# Patient Record
Sex: FEMALE | Race: OTHER | URBAN - METROPOLITAN AREA
[De-identification: names, ages, dates, MRNs, and addresses within clinical notes are randomized per-mention and may not be internally consistent; named-entity substitution may affect disease eponyms.]

---

## 2017-07-03 ENCOUNTER — HOSPITAL ENCOUNTER (EMERGENCY)
Age: 37
Discharge: HOME | End: 2017-07-03
Attending: EMERGENCY MEDICINE
Payer: SELF-PAY

## 2017-07-03 ENCOUNTER — APPOINTMENT (OUTPATIENT)
Dept: CT IMAGING | Age: 37
End: 2017-07-03
Attending: EMERGENCY MEDICINE
Payer: SELF-PAY

## 2017-07-03 VITALS
BODY MASS INDEX: 31.39 KG/M2 | HEIGHT: 67 IN | SYSTOLIC BLOOD PRESSURE: 128 MMHG | TEMPERATURE: 98.9 F | HEART RATE: 98 BPM | WEIGHT: 200 LBS | DIASTOLIC BLOOD PRESSURE: 78 MMHG | RESPIRATION RATE: 16 BRPM | OXYGEN SATURATION: 99 %

## 2017-07-03 DIAGNOSIS — H60.501 ACUTE OTITIS EXTERNA OF RIGHT EAR, UNSPECIFIED TYPE: Primary | ICD-10-CM

## 2017-07-03 DIAGNOSIS — H70.91 MASTOIDITIS, RIGHT: ICD-10-CM

## 2017-07-03 DIAGNOSIS — H66.90 ACUTE OTITIS MEDIA, UNSPECIFIED LATERALITY, UNSPECIFIED OTITIS MEDIA TYPE: ICD-10-CM

## 2017-07-03 LAB
ALBUMIN SERPL BCP-MCNC: 3.4 G/DL (ref 3.5–5)
ALBUMIN/GLOB SERPL: 0.7 {RATIO} (ref 1.2–3.5)
ALP SERPL-CCNC: 101 U/L (ref 50–136)
ALT SERPL-CCNC: 34 U/L (ref 12–65)
ANION GAP BLD CALC-SCNC: 10 MMOL/L (ref 7–16)
AST SERPL W P-5'-P-CCNC: 25 U/L (ref 15–37)
BASOPHILS # BLD AUTO: 0 K/UL (ref 0–0.2)
BASOPHILS # BLD: 0 % (ref 0–2)
BILIRUB SERPL-MCNC: 0.4 MG/DL (ref 0.2–1.1)
BUN SERPL-MCNC: 13 MG/DL (ref 6–23)
CALCIUM SERPL-MCNC: 8.8 MG/DL (ref 8.3–10.4)
CHLORIDE SERPL-SCNC: 102 MMOL/L (ref 98–107)
CO2 SERPL-SCNC: 25 MMOL/L (ref 21–32)
CREAT SERPL-MCNC: 0.77 MG/DL (ref 0.6–1)
DIFFERENTIAL METHOD BLD: ABNORMAL
EOSINOPHIL # BLD: 0.1 K/UL (ref 0–0.8)
EOSINOPHIL NFR BLD: 1 % (ref 0.5–7.8)
ERYTHROCYTE [DISTWIDTH] IN BLOOD BY AUTOMATED COUNT: 13 % (ref 11.9–14.6)
GLOBULIN SER CALC-MCNC: 4.8 G/DL (ref 2.3–3.5)
GLUCOSE SERPL-MCNC: 104 MG/DL (ref 65–100)
HCG UR QL: NEGATIVE
HCG UR QL: NEGATIVE
HCT VFR BLD AUTO: 38.6 % (ref 35.8–46.3)
HGB BLD-MCNC: 13.4 G/DL (ref 11.7–15.4)
IMM GRANULOCYTES # BLD: 0 K/UL (ref 0–0.5)
IMM GRANULOCYTES NFR BLD AUTO: 0.3 % (ref 0–5)
LACTATE BLD-SCNC: 0.9 MMOL/L (ref 0.5–1.9)
LYMPHOCYTES # BLD AUTO: 12 % (ref 13–44)
LYMPHOCYTES # BLD: 1.1 K/UL (ref 0.5–4.6)
MCH RBC QN AUTO: 30.7 PG (ref 26.1–32.9)
MCHC RBC AUTO-ENTMCNC: 34.7 G/DL (ref 31.4–35)
MCV RBC AUTO: 88.3 FL (ref 79.6–97.8)
MONOCYTES # BLD: 1.2 K/UL (ref 0.1–1.3)
MONOCYTES NFR BLD AUTO: 13 % (ref 4–12)
NEUTS SEG # BLD: 7.2 K/UL (ref 1.7–8.2)
NEUTS SEG NFR BLD AUTO: 74 % (ref 43–78)
PLATELET # BLD AUTO: 256 K/UL (ref 150–450)
PMV BLD AUTO: 9.2 FL (ref 10.8–14.1)
POTASSIUM SERPL-SCNC: 3.7 MMOL/L (ref 3.5–5.1)
PROT SERPL-MCNC: 8.2 G/DL (ref 6.3–8.2)
RBC # BLD AUTO: 4.37 M/UL (ref 4.05–5.25)
SODIUM SERPL-SCNC: 137 MMOL/L (ref 136–145)
WBC # BLD AUTO: 9.7 K/UL (ref 4.3–11.1)

## 2017-07-03 PROCEDURE — 81003 URINALYSIS AUTO W/O SCOPE: CPT | Performed by: EMERGENCY MEDICINE

## 2017-07-03 PROCEDURE — 96365 THER/PROPH/DIAG IV INF INIT: CPT | Performed by: EMERGENCY MEDICINE

## 2017-07-03 PROCEDURE — 74011250636 HC RX REV CODE- 250/636: Performed by: EMERGENCY MEDICINE

## 2017-07-03 PROCEDURE — 85025 COMPLETE CBC W/AUTO DIFF WBC: CPT | Performed by: EMERGENCY MEDICINE

## 2017-07-03 PROCEDURE — 80053 COMPREHEN METABOLIC PANEL: CPT | Performed by: EMERGENCY MEDICINE

## 2017-07-03 PROCEDURE — 96375 TX/PRO/DX INJ NEW DRUG ADDON: CPT | Performed by: EMERGENCY MEDICINE

## 2017-07-03 PROCEDURE — 74011000258 HC RX REV CODE- 258: Performed by: EMERGENCY MEDICINE

## 2017-07-03 PROCEDURE — 70480 CT ORBIT/EAR/FOSSA W/O DYE: CPT

## 2017-07-03 PROCEDURE — 83605 ASSAY OF LACTIC ACID: CPT

## 2017-07-03 PROCEDURE — 99284 EMERGENCY DEPT VISIT MOD MDM: CPT | Performed by: EMERGENCY MEDICINE

## 2017-07-03 PROCEDURE — 81025 URINE PREGNANCY TEST: CPT

## 2017-07-03 RX ORDER — OFLOXACIN 3 MG/ML
5 SOLUTION AURICULAR (OTIC) 2 TIMES DAILY
COMMUNITY

## 2017-07-03 RX ORDER — NEOMYCIN SULFATE, POLYMYXIN B SULFATE AND HYDROCORTISONE 10; 3.5; 1 MG/ML; MG/ML; [USP'U]/ML
3 SUSPENSION/ DROPS AURICULAR (OTIC) 3 TIMES DAILY
Qty: 10 ML | Refills: 0 | Status: SHIPPED | OUTPATIENT
Start: 2017-07-03

## 2017-07-03 RX ORDER — AMPICILLIN 500 MG/1
CAPSULE ORAL 2 TIMES DAILY
COMMUNITY

## 2017-07-03 RX ORDER — AMOXICILLIN AND CLAVULANATE POTASSIUM 875; 125 MG/1; MG/1
1 TABLET, FILM COATED ORAL 2 TIMES DAILY
Qty: 20 TAB | Refills: 0 | Status: SHIPPED | OUTPATIENT
Start: 2017-07-03 | End: 2017-07-13

## 2017-07-03 RX ORDER — KETOROLAC TROMETHAMINE 30 MG/ML
30 INJECTION, SOLUTION INTRAMUSCULAR; INTRAVENOUS
Status: COMPLETED | OUTPATIENT
Start: 2017-07-03 | End: 2017-07-03

## 2017-07-03 RX ADMIN — CEFTRIAXONE 1 G: 1 INJECTION, POWDER, FOR SOLUTION INTRAMUSCULAR; INTRAVENOUS at 21:58

## 2017-07-03 RX ADMIN — KETOROLAC TROMETHAMINE 30 MG: 30 INJECTION, SOLUTION INTRAMUSCULAR at 21:58

## 2017-07-03 RX ADMIN — SODIUM CHLORIDE 1000 ML: 900 INJECTION, SOLUTION INTRAVENOUS at 21:58

## 2017-07-03 NOTE — ED PROVIDER NOTES
HPI Comments: Pt with chronic ear infections treated previously in [facility]. Last ear infection a year ago. Pain and drainage started Saturday. Patient is a 40 y.o. female presenting with ear pain. The history is provided by the patient. A  was used. Ear Pain    This is a new problem. The current episode started 2 days ago. The problem occurs constantly. The problem has been gradually worsening. Patient complains that the right ear is affected. There has been no fever. The pain is moderate. Associated symptoms include ear discharge. Pertinent negatives include no headaches, no rhinorrhea, no sore throat, no abdominal pain, no diarrhea, no vomiting, no neck pain and no rash. Her past medical history is significant for chronic ear infection. Past Medical History:   Diagnosis Date    Hearing loss in right ear     w/ chronic infections    Mass of skin     Miscarriage        Past Surgical History:   Procedure Laterality Date    HX  SECTION           Family History:   Problem Relation Age of Onset    Diabetes Other      type 2       Social History     Social History    Marital status:      Spouse name: N/A    Number of children: N/A    Years of education: N/A     Occupational History    Not on file. Social History Main Topics    Smoking status: Never Smoker    Smokeless tobacco: Not on file    Alcohol use No    Drug use: No    Sexual activity: No     Other Topics Concern    Not on file     Social History Narrative         ALLERGIES: Review of patient's allergies indicates no known allergies. Review of Systems   Constitutional: Negative for chills and fever. HENT: Positive for ear discharge and ear pain. Negative for rhinorrhea and sore throat. Eyes: Negative for pain and redness. Respiratory: Negative for chest tightness, shortness of breath and wheezing. Cardiovascular: Negative for chest pain and leg swelling.    Gastrointestinal: Negative for abdominal pain, diarrhea, nausea and vomiting. Genitourinary: Negative for dysuria and hematuria. Musculoskeletal: Negative for back pain, gait problem, neck pain and neck stiffness. Skin: Negative for color change and rash. Neurological: Negative for weakness, numbness and headaches. Vitals:    07/03/17 1910   BP: 131/87   Pulse: (!) 118   Resp: 18   SpO2: 96%   Weight: 90.7 kg (200 lb)   Height: 5' 7\" (1.702 m)            Physical Exam   Constitutional: She is oriented to person, place, and time. She appears well-developed and well-nourished. HENT:   Head: Normocephalic and atraumatic. Right Ear: There is drainage, swelling and tenderness. There is mastoid tenderness. Left Ear: Tympanic membrane, external ear and ear canal normal.   Eyes: Conjunctivae and EOM are normal. Pupils are equal, round, and reactive to light. Neck: Normal range of motion. Neck supple. Cardiovascular: Regular rhythm. Tachycardia present. No murmur heard. Pulmonary/Chest: Effort normal and breath sounds normal. She has no wheezes. Abdominal: Soft. Bowel sounds are normal. There is no tenderness. Musculoskeletal: Normal range of motion. She exhibits no edema. Lymphadenopathy:     She has no cervical adenopathy. Neurological: She is alert and oriented to person, place, and time. Skin: Skin is warm and dry. Nursing note and vitals reviewed. MDM  Number of Diagnoses or Management Options  Diagnosis management comments: Otitis externa and media with mastoiditis without bone involvement. Discussed with Dr. Hanna Marte and will start on abx and will see in clinic for debridement and follow up.         Amount and/or Complexity of Data Reviewed  Clinical lab tests: ordered and reviewed  Tests in the radiology section of CPT®: ordered and reviewed  Tests in the medicine section of CPT®: ordered and reviewed    Patient Progress  Patient progress: stable    ED Course       Procedures           Results Include:    Recent Results (from the past 24 hour(s))   CBC WITH AUTOMATED DIFF    Collection Time: 07/03/17  7:40 PM   Result Value Ref Range    WBC 9.7 4.3 - 11.1 K/uL    RBC 4.37 4.05 - 5.25 M/uL    HGB 13.4 11.7 - 15.4 g/dL    HCT 38.6 35.8 - 46.3 %    MCV 88.3 79.6 - 97.8 FL    MCH 30.7 26.1 - 32.9 PG    MCHC 34.7 31.4 - 35.0 g/dL    RDW 13.0 11.9 - 14.6 %    PLATELET 572 501 - 997 K/uL    MPV 9.2 (L) 10.8 - 14.1 FL    DF AUTOMATED      NEUTROPHILS 74 43 - 78 %    LYMPHOCYTES 12 (L) 13 - 44 %    MONOCYTES 13 (H) 4.0 - 12.0 %    EOSINOPHILS 1 0.5 - 7.8 %    BASOPHILS 0 0.0 - 2.0 %    IMMATURE GRANULOCYTES 0.3 0.0 - 5.0 %    ABS. NEUTROPHILS 7.2 1.7 - 8.2 K/UL    ABS. LYMPHOCYTES 1.1 0.5 - 4.6 K/UL    ABS. MONOCYTES 1.2 0.1 - 1.3 K/UL    ABS. EOSINOPHILS 0.1 0.0 - 0.8 K/UL    ABS. BASOPHILS 0.0 0.0 - 0.2 K/UL    ABS. IMM. GRANS. 0.0 0.0 - 0.5 K/UL   METABOLIC PANEL, COMPREHENSIVE    Collection Time: 07/03/17  7:40 PM   Result Value Ref Range    Sodium 137 136 - 145 mmol/L    Potassium 3.7 3.5 - 5.1 mmol/L    Chloride 102 98 - 107 mmol/L    CO2 25 21 - 32 mmol/L    Anion gap 10 7 - 16 mmol/L    Glucose 104 (H) 65 - 100 mg/dL    BUN 13 6 - 23 MG/DL    Creatinine 0.77 0.6 - 1.0 MG/DL    GFR est AA >60 >60 ml/min/1.73m2    GFR est non-AA >60 >60 ml/min/1.73m2    Calcium 8.8 8.3 - 10.4 MG/DL    Bilirubin, total 0.4 0.2 - 1.1 MG/DL    ALT (SGPT) 34 12 - 65 U/L    AST (SGOT) 25 15 - 37 U/L    Alk.  phosphatase 101 50 - 136 U/L    Protein, total 8.2 6.3 - 8.2 g/dL    Albumin 3.4 (L) 3.5 - 5.0 g/dL    Globulin 4.8 (H) 2.3 - 3.5 g/dL    A-G Ratio 0.7 (L) 1.2 - 3.5     POC LACTIC ACID    Collection Time: 07/03/17  7:46 PM   Result Value Ref Range    Lactic Acid (POC) 0.9 0.5 - 1.9 mmol/L   HCG URINE, QL. - POC    Collection Time: 07/03/17  8:01 PM   Result Value Ref Range    Pregnancy test,urine (POC) NEGATIVE  NEG     HCG URINE, QL. - POC    Collection Time: 07/03/17  8:12 PM   Result Value Ref Range    Pregnancy test,urine (POC) NEGATIVE  NEG       CT ORBIT EAR FOSSA WO CONT (Final result) Result time: 07/03/17 21:21:31     Final result by Dustin Medrano MD (07/03/17 21:21:31)     Impression:     IMPRESSION:    Right otitis externum. Right otitis media. Right mastoiditis. No evidence of cholesteatoma formation at the present time. Unremarkable left temporal bone. Date of Dictation: 7/3/2017 9:16 PM     Narrative:     CT OF THE TEMPORAL BONES    HISTORY: Right ear drainage. COMPARISON : None. TECHNIQUE: High resolution submillimeter axial scanning of each temporal bone  with coronal reconstruction. Dose reduction techniques used: Automated exposure control, adjustment of the  mAs and/or kVp according to patient's size, and iterative reconstruction  techniques. RIGHT:  * MASTOID AIR CELLS: Complete opacification without evidence of bony  destruction. * EXTERNAL EAR: Diffuse soft tissue thickening with obstruction of the external  auditory canal.   * MIDDLE EAR:  -   Completely opacified. The scutum and middle ear ossicles are intact. The  epitympanum is intact. *INNER EAR STRUCTURES:  -   Unremarkable cochlea, vestibule, facial nerve canal, and semicircular  canals.    -   Unremarkable IAC.      LEFT:  * MASTOID AIR CELLS: Clear  * EXTERNAL EAR: Mild cerumen buildup. * MIDDLE EAR:  -   Normal ossicles.  No erosion of the scutum.  No abnormal opacity. *INNER EAR STRUCTURES:  -   Unremarkable cochlea, vestibule, facial nerve canal, and semicircular  canals.    -   Unremarkable IAC.      * NASOPHARYNX: Within normal limits.   * OTHER: No other findings.

## 2017-07-03 NOTE — ED TRIAGE NOTES
Pt presents to ER for R ear pain since Sat, started Abx from [facility] and ear drops from last year.   All; information in chart obtained through [facility]ain #[id number]

## 2017-07-04 NOTE — DISCHARGE INSTRUCTIONS
Oído de nadador: Instrucciones de cuidado - [ Swimmer's Ear: Care Instructions ]  Instrucciones de cuidado    El oído de nadador (otitis externa) es wali inflamación o infección del canal auditivo. Princess es el conducto que va del oído externo hasta el tímpano. Cualquier residuo de [place], arena u otros que entren al canal [place] y permanezcan allí pueden causar oído de nadador. Introducirse hisopos de algodón u otros objetos en el oído para limpiarlo también puede causar princess problema. El oído de nadador puede ser [employer]. Shiva se puede tratar [employer] y la infección con medicamentos. Usted debería sentirse mejor en algunos días. La atención de seguimiento es wali parte clave de bowden tratamiento y seguridad. Asegúrese de hacer y acudir a todas las citas, y llame a bowden médico si está teniendo problemas. También es wali buena idea saber los resultados de los exámenes y mantener wali lista de los medicamentos que erasto. ¿Cómo puede cuidarse en el hogar? Limpieza y cuidado  · Aplíquese las gotas antibióticas nancy se lo indique el médico.  · No utilice gotas para los oídos (que no joel gotas antibióticas) ni ninguna otra cosa dentro de los oídos a menos que bowden médico se lo haya indicado. · Evite que le entre agua en el oído hasta que pase el [place]. Utilice un algodón cubierto con un poco de vaselina nancy tapón. No use tapones de plástico.  · Utilice un secador de pelo a baja temperatura para secarse el oído con cuidado después de ducharse. · Para aliviar el dolor, póngase wali toallita tibia [place]. · [employer] analgésicos (medicamentos para el dolor) exactamente según las indicaciones. ¨ Si el médico le recetó un analgésico, tómelo según las indicaciones. ¨ Si no está tomando un analgésico recetado, pregúntele a bowden médico si puede shai chris de [employer]. Aplicación de gotas para los oídos  · [employer] gotas a la temperatura corporal haciendo rodar el recipiente [employer].  O puede ponerlo en wali taza de agua tibia lopez algunos minutos. · Acuéstese, con el oído [place]. · Póngase gotas dentro del oído. Siga las instrucciones de bowden médico (o las indicaciones de la etiqueta) para saber cuántas gotas usar. Mueva bowden oreja con suavidad o tire de anna [place] atrás para ayudar a que las gotas entren en el oído. · Es importante mantener el líquido en el canal auditivo por entre 3 y 5 minutos. ¿Cuándo debe pedir ayuda? Llame a bowden médico ahora mismo o busque atención médica inmediata si:  · Tiene fiebre nueva o más vita. · Tiene nuevo o mayor dolor, hinchazón, calor o enrojecimiento alrededor o detrás de la [place]. · Bowden oído presenta secreción de pus o heraclio nueva o que está aumentando. Preste especial atención a los cambios en bowden diego y asegúrese de comunicarse con bowden médico si:  · No está mejorando después de 2 días (48 horas). ¿Dónde puede encontrar más información en inglés? Ronaldo Litter a http://yadi-gregg.info/. Agustin Marquez [id number] en la búsqueda para aprender más acerca de \"Oído de nadador: Instrucciones de cuidado - [ Swimmer's Ear: Care Instructions ]. \"  Revisado: 29 julio, 2016  Versión del contenido: 11.3  © 5424-8962 Healthwise, Incorporated. Las instrucciones de cuidado fueron adaptadas bajo licencia por [employer] (which disclaims liability or warranty for this information). Si usted tiene [place] afección médica o sobre estas instrucciones, siempre pregunte a bowden profesional de diego. Healthwise, Incorporated niega toda garantía o responsabilidad por bowden uso de esta información. Infección del oído (otitis media): Instrucciones de cuidado - [ Ear Infection (Otitis Media): Care Instructions ]  Instrucciones de cuidado    Wali infección de oído podría comenzar con un resfriado y afectar el oído medio (otitis media). Puede doler mucho. La mayoría de las infecciones de oído sanan por sí solas en un par de días.  A menudo, no se necesitan antibióticos. La razón es que muchas infecciones de oído están causadas por virus. Los antibióticos no funcionan contra los virus. Las dosis regulares de analgésicos (medicamentos para el dolor) son la mejor manera de reducir la fiebre y ayudarle a sentirse mejor. La atención de seguimiento es wali parte clave de orantes tratamiento y seguridad. Asegúrese de hacer y acudir a todas las citas, y llame a orantes médico si está teniendo problemas. También es wali buena idea saber los resultados de los exámenes y mantener wali lista de los medicamentos que erasto. ¿Cómo puede cuidarse en el hogar? · [employer] analgésicos exactamente nancy le fueron indicados. ¨ Si el médico le recetó un analgésico, tómelo según las indicaciones. ¨ Si no está tomando un analgésico recetado, tome chris de venta genet, nancy acetaminofén (Tylenol), ibuprofeno (Advil, Motrin) o naproxeno (Aleve). Beti y siga todas las instrucciones de la [place]. ¨ No tome dos o más analgésicos al mismo tiempo a menos que el médico se lo haya indicado. Muchos analgésicos contienen acetaminofén, es decir, Tylenol. El exceso de acetaminofén (Tylenol) puede ser dañino. · Planee tomarse wali dosis completa de analgésico antes de acostarse. Dormir lo suficiente le ayudará a sentirse mejor. · Pruebe con wali toallita tibia húmeda en el oído. Bran vez le ayude a aliviar el dolor. · Si orantes médico le recetó antibióticos, tómelos según las indicaciones. No deje de tomarlos por el hecho de sentirse mejor. Debe shai todos los antibióticos hasta terminarlos. ¿Cuándo debe pedir ayuda? Llame a orantes médico ahora mismo o busque atención médica inmediata si:  · Tiene dolor de oído nuevo o que [place]. · Orantes oído presenta secreción de pus o heraclio nueva o que está aumentando. · Tiene fiebre junto con rigidez en el yari o un dolor de april intenso.   Preste especial atención a los cambios en orantes diego y asegúrese de comunicarse con orantes médico si:  · Tiene síntomas nuevos o que Diane Schmitz. · No mejora después de pooja tomado un antibiótico lopez 2 días. ¿Dónde puede encontrar más información en inglés? Jewel Flowerse a http://yadi-gregg.info/. Kia Roberts en la búsqueda para aprender más acerca de \"Infección del oído (otitis media): Instrucciones de cuidado - [ Ear Infection (Otitis Media): Care Instructions ]. \"  Revisado: 29 julio, 2016  Versión del contenido: 11.3  © 3674-5552 Healthwise, Incorporated. Las instrucciones de cuidado fueron adaptadas bajo licencia por [employer] (which disclaims liability or warranty for this information). Si usted tiene [place] afección médica o sobre estas instrucciones, siempre pregunte a bowden profesional de diego. Healthwise, Incorporated niega toda garantía o responsabilidad por bowden uso de esta información.

## 2017-07-04 NOTE — ED NOTES
I have reviewed discharge instructions with the patient. The patient verbalized understanding. Patient was given prescription. Pt ambulatory upon discharge home with spouse. All instructions given through hospital  with pt.

## 2017-07-14 PROBLEM — B37.31 VAGINAL CANDIDIASIS: Status: ACTIVE | Noted: 2017-07-14

## 2017-07-14 PROBLEM — J02.9 SORE THROAT: Status: ACTIVE | Noted: 2017-07-14

## 2017-07-14 PROBLEM — N89.8 VAGINAL PRURITUS: Status: ACTIVE | Noted: 2017-07-14

## 2022-03-19 PROBLEM — B37.31 VAGINAL CANDIDIASIS: Status: ACTIVE | Noted: 2017-07-14

## 2022-03-19 PROBLEM — J02.9 SORE THROAT: Status: ACTIVE | Noted: 2017-07-14

## 2022-03-20 PROBLEM — N89.8 VAGINAL PRURITUS: Status: ACTIVE | Noted: 2017-07-14

## 2023-03-09 ENCOUNTER — OFFICE VISIT (OUTPATIENT)
Dept: SURGERY | Age: 43
End: 2023-03-09

## 2023-03-09 ENCOUNTER — PREP FOR PROCEDURE (OUTPATIENT)
Dept: SURGERY | Age: 43
End: 2023-03-09

## 2023-03-09 VITALS
OXYGEN SATURATION: 99 % | HEIGHT: 67 IN | DIASTOLIC BLOOD PRESSURE: 80 MMHG | BODY MASS INDEX: 35.63 KG/M2 | SYSTOLIC BLOOD PRESSURE: 122 MMHG | HEART RATE: 86 BPM | WEIGHT: 227 LBS

## 2023-03-09 DIAGNOSIS — D22.9 MULTIPLE ATYPICAL SKIN MOLES: ICD-10-CM

## 2023-03-09 DIAGNOSIS — R22.2 MASS OF SUBCUTANEOUS TISSUE OF BACK: ICD-10-CM

## 2023-03-09 PROCEDURE — 99204 OFFICE O/P NEW MOD 45 MIN: CPT | Performed by: STUDENT IN AN ORGANIZED HEALTH CARE EDUCATION/TRAINING PROGRAM

## 2023-03-09 NOTE — PROGRESS NOTES
General Surgery New Patient Office Note:    3/9/2023    Estelle Ballard  MRN: [id number]      CHIEF COMPLAINT: Back mass    PRIMARY CARE PHYSICIAN: Pcp No    HISTORY: Patient presents with a left-sided back mass. Patient states it has been there for couple of years. she states that it is getting larger in size. Patient denies the area ever being infected or draining. Patient does state that it causes her some discomfort in that area    REVIEW OF SYSTEMS: 10 Point ROS negative except what is in HPI      History reviewed. No pertinent past medical history. No current outpatient medications on file. No current facility-administered medications for this visit. No family history on file. Social History     Socioeconomic History    Marital status:      Spouse name: None    Number of children: None    Years of education: None    Highest education level: None   Tobacco Use    Smoking status: Never    Smokeless tobacco: Never         PHYSICAL EXAMINATION:    /80   Pulse 86   Ht 5' 7\" (1.702 m)   Wt 227 lb (103 kg)   SpO2 99%   BMI 35.55 kg/m²     General Appearance AOx3, in no acute distress  Eyes Conjunctivae/corneas clear. PERRL, EOM's intact. No scleral icterus  Ears, Nose, Throat ENT exam normal, no neck nodes or sinus tenderness  Neck supple, no significant adenopathy. No notable JVD  Respiratory No chest wall deformities or tenderness, respiratory effort normal, no use of accessory muscles. Cardiovascular RRR. No chest wall tenderness. Gastrointestinal Abdomen soft, nontender, nondistended. BS x4. No rebound, guarding, or rigidity present. No palpable masses. No CVA tenderness  Lymphatics No palpable lymphadenopathy, no hepatosplenomegaly  Musculoskeletal No joint tenderness, deformity or swelling. Full ROM UE/LE. Distal pulses intact UE/LE. No edema, cyanosis, or venous stasis changes.   Skin Normal coloration and turgor, no rashes, large back mass on the left scapular region. 2 atypical moles within the same area  Neurological alert, oriented, normal speech, no focal findings or movement disorder noted, CN II-XII intact  Psychiatric Alert and oriented, appropriate affect      STUDIES: None    IMPRESSION:  Back mass  Atypical moles    PLAN:  We will plan for surgical excision of the back mass. I did discuss with her that sitting over her muscle belly and I am unsure if this is incorporated within her muscle. Also due to the size I recommend doing this within the hospital.  I will excise this and shave off her 2 abnormal appearing moles. Risks of surgery discussed with pt and/or family present include risks of anesthesia (cardiopulmonary complications), MI, CVA, DVT,pain, bleeding, infection, injury to local viscera, wound problems, conversion to open procedure if laparoscopic procedure planned, and incomplete symptom resolution. They understand and agree to proceed.

## 2023-05-04 RX ORDER — DEXAMETHASONE SODIUM PHOSPHATE 1 MG/ML
3 SOLUTION/ DROPS OPHTHALMIC 3 TIMES DAILY
COMMUNITY
End: 2023-05-04

## 2023-05-08 ENCOUNTER — PREP FOR PROCEDURE (OUTPATIENT)
Dept: SURGERY | Age: 43
End: 2023-05-08

## 2023-05-08 RX ORDER — SODIUM CHLORIDE 0.9 % (FLUSH) 0.9 %
5-40 SYRINGE (ML) INJECTION PRN
Status: CANCELLED | OUTPATIENT
Start: 2023-05-08

## 2023-05-08 RX ORDER — SODIUM CHLORIDE 0.9 % (FLUSH) 0.9 %
5-40 SYRINGE (ML) INJECTION EVERY 12 HOURS SCHEDULED
Status: CANCELLED | OUTPATIENT
Start: 2023-05-08

## 2023-05-08 RX ORDER — SODIUM CHLORIDE 9 MG/ML
INJECTION, SOLUTION INTRAVENOUS PRN
Status: CANCELLED | OUTPATIENT
Start: 2023-05-08

## 2023-05-19 ENCOUNTER — ANESTHESIA EVENT (OUTPATIENT)
Dept: SURGERY | Age: 43
End: 2023-05-19

## 2023-05-22 ENCOUNTER — HOSPITAL ENCOUNTER (OUTPATIENT)
Age: 43
Setting detail: OUTPATIENT SURGERY
Discharge: HOME | End: 2023-05-22
Attending: STUDENT IN AN ORGANIZED HEALTH CARE EDUCATION/TRAINING PROGRAM | Admitting: STUDENT IN AN ORGANIZED HEALTH CARE EDUCATION/TRAINING PROGRAM

## 2023-05-22 ENCOUNTER — ANESTHESIA (OUTPATIENT)
Dept: SURGERY | Age: 43
End: 2023-05-22

## 2023-05-22 VITALS
SYSTOLIC BLOOD PRESSURE: 116 MMHG | TEMPERATURE: 97.7 F | HEART RATE: 73 BPM | RESPIRATION RATE: 15 BRPM | OXYGEN SATURATION: 95 % | HEIGHT: 67 IN | DIASTOLIC BLOOD PRESSURE: 56 MMHG | BODY MASS INDEX: 36.1 KG/M2 | WEIGHT: 230 LBS

## 2023-05-22 DIAGNOSIS — R22.2 MASS OF SUBCUTANEOUS TISSUE OF BACK: Primary | ICD-10-CM

## 2023-05-22 LAB — HCG UR QL: NEGATIVE

## 2023-05-22 PROCEDURE — 2709999900 HC NON-CHARGEABLE SUPPLY: Performed by: STUDENT IN AN ORGANIZED HEALTH CARE EDUCATION/TRAINING PROGRAM

## 2023-05-22 PROCEDURE — 81025 URINE PREGNANCY TEST: CPT

## 2023-05-22 PROCEDURE — 3600000012 HC SURGERY LEVEL 2 ADDTL 15MIN: Performed by: STUDENT IN AN ORGANIZED HEALTH CARE EDUCATION/TRAINING PROGRAM

## 2023-05-22 PROCEDURE — 3700000000 HC ANESTHESIA ATTENDED CARE: Performed by: STUDENT IN AN ORGANIZED HEALTH CARE EDUCATION/TRAINING PROGRAM

## 2023-05-22 PROCEDURE — 2500000003 HC RX 250 WO HCPCS: Performed by: STUDENT IN AN ORGANIZED HEALTH CARE EDUCATION/TRAINING PROGRAM

## 2023-05-22 PROCEDURE — 21931 EXC BACK LES SC 3 CM/>: CPT | Performed by: STUDENT IN AN ORGANIZED HEALTH CARE EDUCATION/TRAINING PROGRAM

## 2023-05-22 PROCEDURE — 11300 SHAVE SKIN LESION 0.5 CM/<: CPT | Performed by: STUDENT IN AN ORGANIZED HEALTH CARE EDUCATION/TRAINING PROGRAM

## 2023-05-22 PROCEDURE — 7100000000 HC PACU RECOVERY - FIRST 15 MIN: Performed by: STUDENT IN AN ORGANIZED HEALTH CARE EDUCATION/TRAINING PROGRAM

## 2023-05-22 PROCEDURE — 7100000001 HC PACU RECOVERY - ADDTL 15 MIN: Performed by: STUDENT IN AN ORGANIZED HEALTH CARE EDUCATION/TRAINING PROGRAM

## 2023-05-22 PROCEDURE — 6370000000 HC RX 637 (ALT 250 FOR IP): Performed by: ANESTHESIOLOGY

## 2023-05-22 PROCEDURE — 3600000002 HC SURGERY LEVEL 2 BASE: Performed by: STUDENT IN AN ORGANIZED HEALTH CARE EDUCATION/TRAINING PROGRAM

## 2023-05-22 PROCEDURE — 88305 TISSUE EXAM BY PATHOLOGIST: CPT

## 2023-05-22 PROCEDURE — 6360000002 HC RX W HCPCS: Performed by: NURSE ANESTHETIST, CERTIFIED REGISTERED

## 2023-05-22 PROCEDURE — 2500000003 HC RX 250 WO HCPCS: Performed by: NURSE ANESTHETIST, CERTIFIED REGISTERED

## 2023-05-22 PROCEDURE — 88304 TISSUE EXAM BY PATHOLOGIST: CPT

## 2023-05-22 PROCEDURE — 2580000003 HC RX 258: Performed by: ANESTHESIOLOGY

## 2023-05-22 PROCEDURE — 7100000010 HC PHASE II RECOVERY - FIRST 15 MIN: Performed by: STUDENT IN AN ORGANIZED HEALTH CARE EDUCATION/TRAINING PROGRAM

## 2023-05-22 PROCEDURE — 11106 INCAL BX SKN SINGLE LES: CPT | Performed by: STUDENT IN AN ORGANIZED HEALTH CARE EDUCATION/TRAINING PROGRAM

## 2023-05-22 PROCEDURE — 6360000002 HC RX W HCPCS: Performed by: STUDENT IN AN ORGANIZED HEALTH CARE EDUCATION/TRAINING PROGRAM

## 2023-05-22 PROCEDURE — 3700000001 HC ADD 15 MINUTES (ANESTHESIA): Performed by: STUDENT IN AN ORGANIZED HEALTH CARE EDUCATION/TRAINING PROGRAM

## 2023-05-22 PROCEDURE — 7100000011 HC PHASE II RECOVERY - ADDTL 15 MIN: Performed by: STUDENT IN AN ORGANIZED HEALTH CARE EDUCATION/TRAINING PROGRAM

## 2023-05-22 RX ORDER — PROPOFOL 10 MG/ML
INJECTION, EMULSION INTRAVENOUS CONTINUOUS PRN
Status: DISCONTINUED | OUTPATIENT
Start: 2023-05-22 | End: 2023-05-22 | Stop reason: SDUPTHER

## 2023-05-22 RX ORDER — DOCUSATE SODIUM 100 MG/1
100 CAPSULE, LIQUID FILLED ORAL 2 TIMES DAILY
Qty: 60 CAPSULE | Refills: 0 | Status: SHIPPED | OUTPATIENT
Start: 2023-05-22 | End: 2023-06-21

## 2023-05-22 RX ORDER — OXYCODONE HYDROCHLORIDE 5 MG/1
5 TABLET ORAL
Status: DISCONTINUED | OUTPATIENT
Start: 2023-05-22 | End: 2023-05-22 | Stop reason: HOSPADM

## 2023-05-22 RX ORDER — BUPIVACAINE HYDROCHLORIDE 5 MG/ML
INJECTION, SOLUTION EPIDURAL; INTRACAUDAL PRN
Status: DISCONTINUED | OUTPATIENT
Start: 2023-05-22 | End: 2023-05-22 | Stop reason: ALTCHOICE

## 2023-05-22 RX ORDER — MIDAZOLAM HYDROCHLORIDE 1 MG/ML
INJECTION INTRAMUSCULAR; INTRAVENOUS PRN
Status: DISCONTINUED | OUTPATIENT
Start: 2023-05-22 | End: 2023-05-22 | Stop reason: SDUPTHER

## 2023-05-22 RX ORDER — MIDAZOLAM HYDROCHLORIDE 2 MG/2ML
2 INJECTION, SOLUTION INTRAMUSCULAR; INTRAVENOUS
Status: DISCONTINUED | OUTPATIENT
Start: 2023-05-22 | End: 2023-05-22 | Stop reason: HOSPADM

## 2023-05-22 RX ORDER — DEXAMETHASONE SODIUM PHOSPHATE 4 MG/ML
INJECTION, SOLUTION INTRA-ARTICULAR; INTRALESIONAL; INTRAMUSCULAR; INTRAVENOUS; SOFT TISSUE PRN
Status: DISCONTINUED | OUTPATIENT
Start: 2023-05-22 | End: 2023-05-22 | Stop reason: SDUPTHER

## 2023-05-22 RX ORDER — ONDANSETRON 2 MG/ML
4 INJECTION INTRAMUSCULAR; INTRAVENOUS
Status: DISCONTINUED | OUTPATIENT
Start: 2023-05-22 | End: 2023-05-22 | Stop reason: HOSPADM

## 2023-05-22 RX ORDER — LIDOCAINE HYDROCHLORIDE AND EPINEPHRINE 10; 10 MG/ML; UG/ML
INJECTION, SOLUTION INFILTRATION; PERINEURAL PRN
Status: DISCONTINUED | OUTPATIENT
Start: 2023-05-22 | End: 2023-05-22 | Stop reason: ALTCHOICE

## 2023-05-22 RX ORDER — SODIUM CHLORIDE 0.9 % (FLUSH) 0.9 %
5-40 SYRINGE (ML) INJECTION PRN
Status: DISCONTINUED | OUTPATIENT
Start: 2023-05-22 | End: 2023-05-22 | Stop reason: HOSPADM

## 2023-05-22 RX ORDER — SODIUM CHLORIDE 0.9 % (FLUSH) 0.9 %
5-40 SYRINGE (ML) INJECTION EVERY 12 HOURS SCHEDULED
Status: DISCONTINUED | OUTPATIENT
Start: 2023-05-22 | End: 2023-05-22 | Stop reason: HOSPADM

## 2023-05-22 RX ORDER — LIDOCAINE HYDROCHLORIDE 20 MG/ML
INJECTION, SOLUTION EPIDURAL; INFILTRATION; INTRACAUDAL; PERINEURAL PRN
Status: DISCONTINUED | OUTPATIENT
Start: 2023-05-22 | End: 2023-05-22 | Stop reason: SDUPTHER

## 2023-05-22 RX ORDER — HYDROMORPHONE HYDROCHLORIDE 2 MG/ML
0.5 INJECTION, SOLUTION INTRAMUSCULAR; INTRAVENOUS; SUBCUTANEOUS EVERY 5 MIN PRN
Status: DISCONTINUED | OUTPATIENT
Start: 2023-05-22 | End: 2023-05-22 | Stop reason: HOSPADM

## 2023-05-22 RX ORDER — SODIUM CHLORIDE 9 MG/ML
INJECTION, SOLUTION INTRAVENOUS PRN
Status: DISCONTINUED | OUTPATIENT
Start: 2023-05-22 | End: 2023-05-22 | Stop reason: HOSPADM

## 2023-05-22 RX ORDER — FENTANYL CITRATE 50 UG/ML
100 INJECTION, SOLUTION INTRAMUSCULAR; INTRAVENOUS
Status: DISCONTINUED | OUTPATIENT
Start: 2023-05-22 | End: 2023-05-22 | Stop reason: HOSPADM

## 2023-05-22 RX ORDER — TRAMADOL HYDROCHLORIDE 50 MG/1
50 TABLET ORAL EVERY 4 HOURS PRN
Qty: 15 TABLET | Refills: 0 | Status: SHIPPED | OUTPATIENT
Start: 2023-05-22 | End: 2023-05-25

## 2023-05-22 RX ORDER — FENTANYL CITRATE 50 UG/ML
INJECTION, SOLUTION INTRAMUSCULAR; INTRAVENOUS PRN
Status: DISCONTINUED | OUTPATIENT
Start: 2023-05-22 | End: 2023-05-22 | Stop reason: SDUPTHER

## 2023-05-22 RX ORDER — ONDANSETRON 2 MG/ML
INJECTION INTRAMUSCULAR; INTRAVENOUS PRN
Status: DISCONTINUED | OUTPATIENT
Start: 2023-05-22 | End: 2023-05-22 | Stop reason: SDUPTHER

## 2023-05-22 RX ORDER — ACETAMINOPHEN 500 MG
1000 TABLET ORAL ONCE
Status: COMPLETED | OUTPATIENT
Start: 2023-05-22 | End: 2023-05-22

## 2023-05-22 RX ORDER — SODIUM CHLORIDE, SODIUM LACTATE, POTASSIUM CHLORIDE, CALCIUM CHLORIDE 600; 310; 30; 20 MG/100ML; MG/100ML; MG/100ML; MG/100ML
INJECTION, SOLUTION INTRAVENOUS CONTINUOUS
Status: DISCONTINUED | OUTPATIENT
Start: 2023-05-22 | End: 2023-05-22 | Stop reason: HOSPADM

## 2023-05-22 RX ORDER — DIPHENHYDRAMINE HYDROCHLORIDE 50 MG/ML
12.5 INJECTION INTRAMUSCULAR; INTRAVENOUS
Status: DISCONTINUED | OUTPATIENT
Start: 2023-05-22 | End: 2023-05-22 | Stop reason: HOSPADM

## 2023-05-22 RX ADMIN — PROPOFOL 200 MCG/KG/MIN: 10 INJECTION, EMULSION INTRAVENOUS at 07:24

## 2023-05-22 RX ADMIN — LIDOCAINE HYDROCHLORIDE 50 MG: 20 INJECTION, SOLUTION EPIDURAL; INFILTRATION; INTRACAUDAL; PERINEURAL at 07:24

## 2023-05-22 RX ADMIN — Medication 2000 MG: at 07:24

## 2023-05-22 RX ADMIN — ONDANSETRON 4 MG: 2 INJECTION INTRAMUSCULAR; INTRAVENOUS at 07:32

## 2023-05-22 RX ADMIN — DEXAMETHASONE SODIUM PHOSPHATE 4 MG: 4 INJECTION, SOLUTION INTRAMUSCULAR; INTRAVENOUS at 07:32

## 2023-05-22 RX ADMIN — SODIUM CHLORIDE, POTASSIUM CHLORIDE, SODIUM LACTATE AND CALCIUM CHLORIDE: 600; 310; 30; 20 INJECTION, SOLUTION INTRAVENOUS at 06:19

## 2023-05-22 RX ADMIN — ACETAMINOPHEN 1000 MG: 500 TABLET, FILM COATED ORAL at 06:19

## 2023-05-22 RX ADMIN — FENTANYL CITRATE 50 MCG: 50 INJECTION, SOLUTION INTRAMUSCULAR; INTRAVENOUS at 07:26

## 2023-05-22 RX ADMIN — MIDAZOLAM 1 MG: 1 INJECTION INTRAMUSCULAR; INTRAVENOUS at 07:26

## 2023-05-22 ASSESSMENT — PAIN - FUNCTIONAL ASSESSMENT: PAIN_FUNCTIONAL_ASSESSMENT: 0-10

## 2023-05-22 NOTE — PERIOP NOTE
Discharge instructions reviewed with pt and pt's , Janis Costa, at bedside. No questions or concerns at this time. Rxs escribed.

## 2023-05-22 NOTE — PROGRESS NOTES
Patient/caregivers speak [place]  as their preferred language for their healthcare communication. For safe communication, use the [employer]  carts or call:    Senior /Navigator Kenna Lopez at [phone] or   [employer] phone services for [facility] at [phone]    General phone: [phone] ( [phone])  Email: [email or web address]    Always document the use of interpreting services ('s ID number) in your clinical notes. Our interpreters are available for team members working with limited  [place] proficient (LEP) patients remotely, via phone or video or in person (if needed for special cases). When using family members to interpret, for the safety of the patient and protection of the communication of both our patient and [facility] staff the VRI or telephonic  should remain on the line to monitor that all communication is accurate and complete. The  should be instructed to notify [facility] staff immediately if there are any inaccuracies.          Thank you,        Kenna GAFFNEY  Senior /Navigator

## 2023-05-22 NOTE — OP NOTE
Excision of back Mass Procedure Note      Name:  Sapphire Vásquez Date/Time of Admission: 2023  5:37 AM  CSN: [id number]  Attending Provider: No att. providers found  MRN:  [id number]  : 1980 44 y.o. Pre-operative Diagnosis: back Mass, 2 abnormal moles    Post-operative Diagnosis: Same    Procedure:  Excision of back mass 6x5cm, excision of mole, shave bx of mole    Surgeon: Luis M Villegas DO     Anesthesia: MAC anesthesia    Specimen: mass     INDICATION: The patient is a 81-year-old female who complains of mass to the back. They would like it surgically removed. DESCRIPTION OF PROCEDURE: The patient was correctly identified in preoperative holding area. Consent was confirmed and placed on the chart. Preoperative antibiotics were administered per SCIP protocol. The patient was then taken back to the operative suite and placed in the lateral position. MAC anesthesia was performed per anesthesia. The patient's was then prepped and draped in the usual sterile fashion. A timeout was performed and all members of the team that were present were in agreement. I utilized a 10 blade to shave off the superior mole. Hemostasis was noted. Bacitracin was placed on the incision. The procedure began by making an elliptical incision over the apex of the mass that encompassed one of the moles. Scalpel was used to sharply dissect through the subcuticular tissue to the capsule of the mass. At this point cautery was used to dissect circumferentially around the mass and its capsule. It was removed in its entirety. Further inspection of the cavity noted no other acute abnormalities. Hemostasis was achieved with electric cautery. The wound was irrigated with normal saline. 3-0 Monocryl suture was used to close the skin in a simple interrupted fashion. Steri strips was placed on the incision. At this time, the procedure was complete.   At the conclusion of the case all sponge,

## 2023-05-22 NOTE — ANESTHESIA POSTPROCEDURE EVALUATION
Department of Anesthesiology  Postprocedure Note    Patient: Keanu Jose  MRN: [id number]  YOB: 1980  Date of evaluation: 5/22/2023      Procedure Summary     Date: 05/22/23 Room / Location: [facility] OP OR 07 / D OPC    Anesthesia Start: 0710 Anesthesia Stop: 0805    Procedure: BACK LESION BIOPSY EXCISION AND SHAVE BIOPSY OF TWO MOLES (Back) Diagnosis:       Mass on back      (Mass on back [R22.2])    Surgeons: Abbie Bowers DO Responsible Provider: Harry Giang MD    Anesthesia Type: TIVA, general ASA Status: 2          Anesthesia Type: No value filed.     Yong Phase I: Yong Score: 8    Yong Phase II:        Anesthesia Post Evaluation    Patient location during evaluation: PACU  Patient participation: complete - patient participated  Level of consciousness: awake and alert  Airway patency: patent  Nausea & Vomiting: no nausea and no vomiting  Complications: no  Cardiovascular status: hemodynamically stable  Respiratory status: acceptable, nonlabored ventilation and spontaneous ventilation  Hydration status: euvolemic  Comments: BP (!) 110/55   Pulse 75   Temp 97.5 °F (36.4 °C) (Tympanic)   Resp 15   Ht 5' 7\" (1.702 m)   Wt 230 lb (104.3 kg)   SpO2 96%   BMI 36.02 kg/m²     Multimodal analgesia pain management approach

## 2023-05-22 NOTE — ANESTHESIA PRE PROCEDURE
Department of Anesthesiology  Preprocedure Note       Name:  Clotilde Ocampo   Age:  44 y.o.  :  1980                                          MRN:  [id number]         Date:  2023      Surgeon: Elvis Rodriguez):  Valentin Barrera DO    Procedure: Procedure(s):  BACK LESION BIOPSY EXCISION AND SHAVE BIOPSY OF TWO MOLES    Medications prior to admission:   Prior to Admission medications    Medication Sig Start Date End Date Taking?  Authorizing Provider   Multiple Vitamin (MULTIVITAMIN ADULT PO) Take 1 tablet by mouth daily   Yes Historical Provider, MD   NONFORMULARY 3 drops in the morning, at noon, and at bedtime 3 gtts right ear- Dexamethasone sodium phosphate   Yes Historical Provider, MD   NONFORMULARY 2 drops in the morning, at noon, and at bedtime 2 gtts TID right ear - Ofloxacine sodium   Yes Historical Provider, MD       Current medications:    Current Facility-Administered Medications   Medication Dose Route Frequency Provider Last Rate Last Admin    fentaNYL (SUBLIMAZE) injection 100 mcg  100 mcg IntraVENous Once PRN Manuel Chavez MD        lactated ringers IV soln infusion   IntraVENous Continuous Manuel Chavez  mL/hr at 23 0619 New Bag at 23 4775    sodium chloride flush 0.9 % injection 5-40 mL  5-40 mL IntraVENous 2 times per day Manuel Chavez MD        sodium chloride flush 0.9 % injection 5-40 mL  5-40 mL IntraVENous PRN Manuel Chavez MD        0.9 % sodium chloride infusion   IntraVENous PRN Manuel Chavez MD        midazolam PF (VERSED) injection 2 mg  2 mg IntraVENous Once PRN Manuel Chavez MD        sodium chloride flush 0.9 % injection 5-40 mL  5-40 mL IntraVENous 2 times per day Valentin Barrera DO        sodium chloride flush 0.9 % injection 5-40 mL  5-40 mL IntraVENous PRN Valentin Barrera DO        0.9 % sodium chloride infusion   IntraVENous PRN Valentin Barrera DO        ceFAZolin

## 2023-05-22 NOTE — CONSULTS
Session ID: [id number]  Request ID: [id number]  Language: Armenian  Status: Fulfilled   ID: #[id number]   Name: Fara Gilliland

## 2023-05-22 NOTE — PROGRESS NOTES
's pre-procedure visit requested by patient. Conveyed care and concern for patient and family. Offered prayer as requested for patient, family, and staff.     Rosalva Pearce MDiv, BS  Board Certified

## 2023-05-22 NOTE — DISCHARGE INSTRUCTIONS
DISCHARGE INSTRUCTIONS    Please call our office for a follow-up appointment in 1-2 week(s). Driving: No driving while taking pain medications    Activity: No strenous activity. Slowly advance as tolerated. No lifting greater than 20lbs. Diet:  Slowly advance as tolerated. Increase your fluids and fiber, as pain medications may cause constipation. No alcoholic beverages. Bathing: You may shower. No tub baths for 5 days. Dressing: If outer dressing, remove in 24 hours. Leave steri strips intact. Other:  Ice to incision as needed for pain. If drains present, empty and record    output daily. Call Dr. Aloha Gaucher if you have:  ? Temperature greater than 100.4  ? Persistent nausea and vomiting  ? Severe uncontrolled pain  ? Redness, tenderness, or signs of infection (pain, swelling, redness, odor or green/yellow discharge around the site)  ? Difficulty breathing, headache or visual disturbances  ? Hives  ? Persistent dizziness or light-headedness  ? Extreme fatigue  ? Any other questions or concerns you may have after discharge    In an emergency, call 911 or go to an Emergency Department at [facility] or [address] [place] [address].    It is important to bring a complete, current list of your medications to any medical appointments or hospitalizations. ACTIVITY  As tolerated and as directed by your doctor. Bathe or shower as directed by your doctor. DIET  Clear liquids until no nausea or vomiting; then light diet for the first day. Advance to regular diet on second day, unless your doctor orders otherwise. If nausea and vomiting continues, call your doctor. PAIN  Take pain medication as directed by your doctor. Call your doctor if pain is NOT relieved by medication. DO NOT take aspirin of blood thinners unless directed by your doctor.      MEDICATION INTERACTION:During your procedure you potentially received a medication or medications which may reduce the effectiveness of oral

## 2023-05-22 NOTE — H&P
swelling. Full ROM UE/LE. Distal pulses intact UE/LE. No edema, cyanosis, or venous stasis changes. Skin     Normal coloration and turgor, no rashes, large back mass on the left scapular region. 2 atypical moles within the same area  Neurological    alert, oriented, normal speech, no focal findings or movement disorder noted, CN II-XII intact  Psychiatric       Alert and oriented, appropriate affect        STUDIES: None     IMPRESSION:  Back mass  Atypical moles     PLAN:  We will plan for surgical excision of the back mass. I did discuss with her that sitting over her muscle belly and I am unsure if this is incorporated within her muscle. Also due to the size I recommend doing this within the hospital.  I will excise this and shave off her 2 abnormal appearing moles. Risks of surgery discussed with pt and/or family present include risks of anesthesia (cardiopulmonary complications), MI, CVA, DVT,pain, bleeding, infection, injury to local viscera, wound problems, conversion to open procedure if laparoscopic procedure planned, and incomplete symptom resolution. They understand and agree to proceed.

## 2023-06-07 ENCOUNTER — OFFICE VISIT (OUTPATIENT)
Dept: SURGERY | Age: 43
End: 2023-06-07

## 2023-06-07 VITALS — BODY MASS INDEX: 36.1 KG/M2 | HEIGHT: 67 IN | WEIGHT: 230 LBS

## 2023-06-07 DIAGNOSIS — Z09 POSTOPERATIVE EXAMINATION: Primary | ICD-10-CM

## 2023-06-07 PROCEDURE — 99024 POSTOP FOLLOW-UP VISIT: CPT | Performed by: STUDENT IN AN ORGANIZED HEALTH CARE EDUCATION/TRAINING PROGRAM

## 2023-06-07 NOTE — PROGRESS NOTES
General Surgery Office Visit:    Pt presents s/p excision of back mass and 2 moles. Pt overall doing well, minimal pain. Tolerating diet and having bowel function. Medications:   Current Outpatient Medications   Medication Sig Dispense Refill    docusate sodium (COLACE) 100 MG capsule Take 1 capsule by mouth 2 times daily 60 capsule 0    Multiple Vitamin (MULTIVITAMIN ADULT PO) Take 1 tablet by mouth daily      NONFORMULARY 3 drops in the morning, at noon, and at bedtime 3 gtts right ear- Dexamethasone sodium phosphate      NONFORMULARY 2 drops in the morning, at noon, and at bedtime 2 gtts TID right ear - Ofloxacine sodium       No current facility-administered medications for this visit. Allergies: No Known Allergies     Past History:  Past Medical History:   Diagnosis Date    COVID-19 2022    not hospitalized      Past Surgical History:   Procedure Laterality Date     SECTION      EAR SURGERY Right 2023    SKIN LESION EXCISION N/A 2023    BACK LESION BIOPSY EXCISION AND SHAVE BIOPSY OF TWO MOLES performed by Raven Peterson DO at [address] [place] [address]        Family and Social History:  No family history on file.   Social History     Socioeconomic History    Marital status:      Spouse name: Not on file    Number of children: Not on file    Years of education: Not on file    Highest education level: Not on file   Occupational History    Not on file   Tobacco Use    Smoking status: Never    Smokeless tobacco: Never   Vaping Use    Vaping Use: Never used   Substance and Sexual Activity    Alcohol use: Never    Drug use: Never    Sexual activity: Not on file   Other Topics Concern    Not on file   Social History Narrative    Not on file     Social Determinants of Health     Financial Resource Strain: Not on file   Food Insecurity: Not on file   Transportation Needs: Not on file   Physical Activity: Not on file   Stress: Not on file   Social Connections: Not on file   Intimate Partner

## 2023-06-07 NOTE — CONSULTS
Session ID: [id number]  Request ID: [id number]  Language: Upper sorbian  Status: Fulfilled   ID: #[id number]   Name: David Guzmán

## (undated) DEVICE — SUTURE VCRL SZ 3-0 L27IN ABSRB UD L26MM SH 1/2 CIR J416H

## (undated) DEVICE — STRIP,CLOSURE,WOUND,MEDI-STRIP,1/2X4: Brand: MEDLINE

## (undated) DEVICE — MINOR SPLIT GENERAL: Brand: MEDLINE INDUSTRIES, INC.

## (undated) DEVICE — NEEDLE HYPO 18GA L1.5IN PNK S STL HUB POLYPR SHLD REG BVL

## (undated) DEVICE — SUTURE MCRYL SZ 3-0 L27IN ABSRB UD L19MM PS-2 3/8 CIR PRIM Y427H

## (undated) DEVICE — PENCIL ES L3M BTTN SWCH HOLSTER W/ BLDE ELECTRD EDGE

## (undated) DEVICE — BLADE ES ELASTOMERIC COAT INSUL DURABLE BEND UPTO 90DEG

## (undated) DEVICE — GLOVE ORANGE PI 7   MSG9070

## (undated) DEVICE — GAUZE,SPONGE,4"X4",16PLY,STRL,LF,10/TRAY: Brand: MEDLINE

## (undated) DEVICE — SOLUTION IRRIG 1000ML 0.9% SOD CHL USP POUR PLAS BTL

## (undated) DEVICE — SUTURE NRLN SZ 2-0 L18IN NONABSORBABLE BLK SH-1 L22MM 1/2 C502D

## (undated) DEVICE — MASTISOL ADHESIVE LIQ 2/3ML

## (undated) DEVICE — SUTURE PROL SZ 2-0 L30IN NONABSORBABLE BLU L26MM CT-2 1/2 8411H

## (undated) DEVICE — GLOVE SURG SZ 7 L12IN FNGR THK79MIL GRN LTX FREE